# Patient Record
Sex: MALE | Race: WHITE | NOT HISPANIC OR LATINO | Employment: OTHER | URBAN - NONMETROPOLITAN AREA
[De-identification: names, ages, dates, MRNs, and addresses within clinical notes are randomized per-mention and may not be internally consistent; named-entity substitution may affect disease eponyms.]

---

## 2024-07-03 ENCOUNTER — OFFICE VISIT (OUTPATIENT)
Dept: FAMILY MEDICINE CLINIC | Facility: CLINIC | Age: 59
End: 2024-07-03
Payer: COMMERCIAL

## 2024-07-03 VITALS
WEIGHT: 231.8 LBS | DIASTOLIC BLOOD PRESSURE: 84 MMHG | OXYGEN SATURATION: 97 % | HEART RATE: 58 BPM | SYSTOLIC BLOOD PRESSURE: 122 MMHG | BODY MASS INDEX: 27.37 KG/M2 | HEIGHT: 77 IN

## 2024-07-03 DIAGNOSIS — Z12.5 PROSTATE CANCER SCREENING: ICD-10-CM

## 2024-07-03 DIAGNOSIS — Z13.220 LIPID SCREENING: ICD-10-CM

## 2024-07-03 DIAGNOSIS — Z13.29 THYROID DISORDER SCREEN: ICD-10-CM

## 2024-07-03 DIAGNOSIS — Z00.00 ANNUAL PHYSICAL EXAM: Primary | ICD-10-CM

## 2024-07-03 DIAGNOSIS — Z13.1 DIABETES MELLITUS SCREENING: ICD-10-CM

## 2024-07-03 DIAGNOSIS — Z12.11 COLON CANCER SCREENING: ICD-10-CM

## 2024-07-03 RX ORDER — METHYLPHENIDATE HYDROCHLORIDE 5 MG/1
1 TABLET ORAL EVERY 12 HOURS SCHEDULED
COMMUNITY
Start: 2024-06-21

## 2024-07-03 NOTE — PROGRESS NOTES
Male Physical Note      Date: 2024   Patient Name: Domingo Shipley  : 1965   MRN: [id number]     Chief Complaint:    Chief Complaint   Patient presents with    [facility] Care    Annual Exam     Patient states he has soreness in his joints, mainly hips/knees.  Would like referral for colonoscopy.    Patient is fasting for labs       History of Present Illness: Domingo Shipley is a 59 y.o. male who is here today for their annual health maintenance and physical.  Patient is here today without any significant complaints.  He is requesting labs and colonoscopy ordered.  He has some bilateral hip pain but would like to hold off on workup at this time.  Patient also has some left shoulder pain/rotator cuff issues but he wants to hold off on any evaluation at this time.      Subjective      Review of Systems:   Review of Systems   Musculoskeletal:  Positive for arthralgias.   All other systems reviewed and are negative.      Past Medical History, Social History, Family History and Care Team were all reviewed with patient and updated as appropriate.     Medications:     Current Outpatient Medications:     methylphenidate (RITALIN) 5 MG tablet, Take 1 tablet by mouth Every 12 (Twelve) Hours., Disp: , Rfl:     Allergies:   No Known Allergies    Immunizations:  Health Maintenance Summary            Overdue - COLORECTAL CANCER SCREENING (View Topic Details) Never done      No completion, postpone, or frequency change history exists for this topic.              Overdue - TDAP/TD VACCINES (1 - Tdap) Never done      No completion, postpone, or frequency change history exists for this topic.              Overdue - HEPATITIS C SCREENING (Once) Never done      No completion, postpone, or frequency change history exists for this topic.              Overdue - ANNUAL PHYSICAL (Yearly) Never done      No completion, postpone, or frequency change history exists for this topic.              Postponed - COVID-19 Vaccine (1 -  "2023-24 season) Postponed until 9/22/2024 07/03/2024  Postponed until 9/22/2024 by Brunilda Sabillon CMA (Product Unavailable)              Postponed - ZOSTER VACCINE (1 of 2) Postponed until 7/3/2025      07/03/2024  Postponed until 7/3/2025 by Brunilda Sabillon CMA (Product Unavailable)              INFLUENZA VACCINE (Yearly - August to March) Next due on 8/1/2024      No completion, postpone, or frequency change history exists for this topic.              BMI FOLLOWUP (Yearly) Next due on 7/3/2025      07/03/2024  Smart[employer]: BMI EDUCATION FOR OVERWEIGHT              Pneumococcal Vaccine 0-64 (Series Information) Aged Out      No completion, postpone, or frequency change history exists for this topic.                    No orders of the defined types were placed in this encounter.      Colorectal Screening:   Pending  Last Completed Colonoscopy       This patient has no relevant Health Maintenance data.          PSA (Over age 50, C Level Recommendation): Pending    A1c: No results found for: \"HGBA1C\"  Lipid panel: No results found for: \"LABLIPI\"    The ASCVD Risk score (Pilar MUNIZ, et al., 2019) failed to calculate for the following reasons:    Cannot find a previous HDL lab    Cannot find a previous total cholesterol lab      Tobacco Use: Low Risk  (7/3/2024)    Patient History     Smoking Tobacco Use: Never     Smokeless Tobacco Use: Never     Passive Exposure: Not on file       Social History     Substance and Sexual Activity   Alcohol Use Yes    Alcohol/week: 2.0 standard drinks of alcohol    Types: 2 Glasses of wine per week    Comment: generally 2 glasses a day (but skip some days)        Social History     Substance and Sexual Activity   Drug Use Never        PHQ-2 Depression Screening  PHQ-9 Total Score: 0         Objective     Physical Exam:  Vital Signs:   Vitals:    07/03/24 1350   BP: 122/84   BP Location: Left arm   Patient Position: Sitting   Cuff Size: Adult   Pulse: 58   SpO2: 97%   Weight: 105 " "kg (231 lb 12.8 oz)   Height: 195.6 cm (77\")     Facility age limit for growth %atiya is 20 years.  Body mass index is 27.49 kg/m².     Physical Exam  Vitals and nursing note reviewed.   Constitutional:       General: He is not in acute distress.     Appearance: Normal appearance. He is not ill-appearing or toxic-appearing.   HENT:      Head: Normocephalic and atraumatic.      Right Ear: Tympanic membrane normal.      Left Ear: Tympanic membrane normal.      Mouth/Throat:      Pharynx: Oropharynx is clear.   Eyes:      Extraocular Movements: Extraocular movements intact.      Pupils: Pupils are equal, round, and reactive to light.   Cardiovascular:      Rate and Rhythm: Normal rate and regular rhythm.   Pulmonary:      Effort: Pulmonary effort is normal.      Breath sounds: Normal breath sounds.   Musculoskeletal:      Left shoulder: Decreased range of motion.      Cervical back: Neck supple.      Right hip: Normal range of motion.      Left hip: Normal range of motion.   Lymphadenopathy:      Cervical: No cervical adenopathy.   Neurological:      General: No focal deficit present.      Mental Status: He is alert.   Psychiatric:         Mood and Affect: Mood normal.         Behavior: Behavior normal.         Thought Content: Thought content normal.         Judgment: Judgment normal.          POCT Results (if applicable):   No results found for this or any previous visit.    Procedures    Assessment / Plan      Assessment/Plan:   Assessment & Plan  Annual physical exam    Colon cancer screening    Lipid screening    Thyroid disorder screen    Prostate cancer screening    Diabetes mellitus screening      Orders Placed This Encounter   Procedures    Hemoglobin A1c    CBC Auto Differential    Comprehensive Metabolic Panel    Lipid Panel    TSH    PSA Screen    Ambulatory Referral to Gastroenterology      Will refer for colonoscopy.  Will check routine labs.  Follow-up in 1 year or as needed.       Healthcare " Maintenance:  Counseling provided based on age appropriate USPSTF guidelines.       Domingo Shipley voices understanding and acceptance of this advice and will call back with any further questions or concerns. AVS with preventive healthcare tips printed for patient.     Vaccine Counseling:      Follow Up:   Return in about 1 year (around 7/3/2025).      María North MD  [facility]

## 2024-07-04 LAB
ALBUMIN SERPL-MCNC: 4.7 G/DL (ref 3.8–4.9)
ALP SERPL-CCNC: 67 IU/L (ref 44–121)
ALT SERPL-CCNC: 19 IU/L (ref 0–44)
AST SERPL-CCNC: 21 IU/L (ref 0–40)
BASOPHILS # BLD AUTO: 0.1 X10E3/UL (ref 0–0.2)
BASOPHILS NFR BLD AUTO: 1 %
BILIRUB SERPL-MCNC: 1 MG/DL (ref 0–1.2)
BUN SERPL-MCNC: 12 MG/DL (ref 6–24)
BUN/CREAT SERPL: 13 (ref 9–20)
CALCIUM SERPL-MCNC: 9.7 MG/DL (ref 8.7–10.2)
CHLORIDE SERPL-SCNC: 104 MMOL/L (ref 96–106)
CHOLEST SERPL-MCNC: 168 MG/DL (ref 100–199)
CO2 SERPL-SCNC: 23 MMOL/L (ref 20–29)
CREAT SERPL-MCNC: 0.89 MG/DL (ref 0.76–1.27)
EGFRCR SERPLBLD CKD-EPI 2021: 100 ML/MIN/1.73
EOSINOPHIL # BLD AUTO: 0.1 X10E3/UL (ref 0–0.4)
EOSINOPHIL NFR BLD AUTO: 1 %
ERYTHROCYTE [DISTWIDTH] IN BLOOD BY AUTOMATED COUNT: 12.3 % (ref 11.6–15.4)
GLOBULIN SER CALC-MCNC: 2.3 G/DL (ref 1.5–4.5)
GLUCOSE SERPL-MCNC: 93 MG/DL (ref 70–99)
HBA1C MFR BLD: 5.6 % (ref 4.8–5.6)
HCT VFR BLD AUTO: 42.8 % (ref 37.5–51)
HDLC SERPL-MCNC: 48 MG/DL
HGB BLD-MCNC: 14.8 G/DL (ref 13–17.7)
IMM GRANULOCYTES # BLD AUTO: 0 X10E3/UL (ref 0–0.1)
IMM GRANULOCYTES NFR BLD AUTO: 0 %
LDLC SERPL CALC-MCNC: 106 MG/DL (ref 0–99)
LYMPHOCYTES # BLD AUTO: 2.2 X10E3/UL (ref 0.7–3.1)
LYMPHOCYTES NFR BLD AUTO: 29 %
MCH RBC QN AUTO: 31 PG (ref 26.6–33)
MCHC RBC AUTO-ENTMCNC: 34.6 G/DL (ref 31.5–35.7)
MCV RBC AUTO: 90 FL (ref 79–97)
MONOCYTES # BLD AUTO: 0.6 X10E3/UL (ref 0.1–0.9)
MONOCYTES NFR BLD AUTO: 8 %
NEUTROPHILS # BLD AUTO: 4.6 X10E3/UL (ref 1.4–7)
NEUTROPHILS NFR BLD AUTO: 61 %
PLATELET # BLD AUTO: 292 X10E3/UL (ref 150–450)
POTASSIUM SERPL-SCNC: 4.1 MMOL/L (ref 3.5–5.2)
PROT SERPL-MCNC: 7 G/DL (ref 6–8.5)
PSA SERPL-MCNC: 0.8 NG/ML (ref 0–4)
RBC # BLD AUTO: 4.78 X10E6/UL (ref 4.14–5.8)
SODIUM SERPL-SCNC: 141 MMOL/L (ref 134–144)
TRIGL SERPL-MCNC: 73 MG/DL (ref 0–149)
TSH SERPL DL<=0.005 MIU/L-ACNC: 1.21 UIU/ML (ref 0.45–4.5)
VLDLC SERPL CALC-MCNC: 14 MG/DL (ref 5–40)
WBC # BLD AUTO: 7.6 X10E3/UL (ref 3.4–10.8)

## 2024-07-05 ENCOUNTER — TELEPHONE (OUTPATIENT)
Dept: FAMILY MEDICINE CLINIC | Facility: CLINIC | Age: 59
End: 2024-07-05
Payer: COMMERCIAL

## 2024-07-05 NOTE — TELEPHONE ENCOUNTER
"Hub to relay Your LDL which is the \"bad cholesterol\" is borderline elevated, but your HDL/LDL ratio is good..  All other labs are within normal limits.  Will recheck in 1 year   "

## 2024-07-05 NOTE — TELEPHONE ENCOUNTER
Name: Domingo Shipley      Relationship: Self      Best Callback Number: [phone]       HUB PROVIDED THE RELAY MESSAGE FROM THE OFFICE      PATIENT: VOICED UNDERSTANDING AND HAS NO FURTHER QUESTIONS AT THIS TIME    ADDITIONAL INFORMATION:

## 2024-07-09 ENCOUNTER — TELEPHONE (OUTPATIENT)
Dept: GASTROENTEROLOGY | Facility: CLINIC | Age: 59
End: 2024-07-09
Payer: COMMERCIAL

## 2024-07-15 ENCOUNTER — OUTSIDE FACILITY SERVICE (OUTPATIENT)
Dept: GASTROENTEROLOGY | Facility: CLINIC | Age: 59
End: 2024-07-15
Payer: COMMERCIAL

## 2024-07-15 PROCEDURE — 88305 TISSUE EXAM BY PATHOLOGIST: CPT | Performed by: INTERNAL MEDICINE

## 2024-07-16 ENCOUNTER — LAB REQUISITION (OUTPATIENT)
Dept: LAB | Facility: HOSPITAL | Age: 59
End: 2024-07-16
Payer: COMMERCIAL

## 2024-07-16 DIAGNOSIS — Q43.8 OTHER SPECIFIED CONGENITAL MALFORMATIONS OF INTESTINE: ICD-10-CM

## 2024-07-16 DIAGNOSIS — D12.5 BENIGN NEOPLASM OF SIGMOID COLON: ICD-10-CM

## 2024-07-16 DIAGNOSIS — D12.2 BENIGN NEOPLASM OF ASCENDING COLON: ICD-10-CM

## 2024-07-16 DIAGNOSIS — D12.0 BENIGN NEOPLASM OF CECUM: ICD-10-CM

## 2024-07-16 DIAGNOSIS — D12.4 BENIGN NEOPLASM OF DESCENDING COLON: ICD-10-CM

## 2024-07-16 DIAGNOSIS — K64.8 OTHER HEMORRHOIDS: ICD-10-CM

## 2024-07-16 DIAGNOSIS — K57.30 DIVERTICULOSIS OF LARGE INTESTINE WITHOUT PERFORATION OR ABSCESS WITHOUT BLEEDING: ICD-10-CM

## 2024-07-16 DIAGNOSIS — Z12.11 ENCOUNTER FOR SCREENING FOR MALIGNANT NEOPLASM OF COLON: ICD-10-CM

## 2024-07-17 LAB — REF LAB TEST METHOD: NORMAL

## 2025-07-07 ENCOUNTER — OFFICE VISIT (OUTPATIENT)
Dept: FAMILY MEDICINE CLINIC | Facility: CLINIC | Age: 60
End: 2025-07-07
Payer: COMMERCIAL

## 2025-07-07 VITALS
HEIGHT: 77 IN | HEART RATE: 64 BPM | OXYGEN SATURATION: 98 % | SYSTOLIC BLOOD PRESSURE: 124 MMHG | DIASTOLIC BLOOD PRESSURE: 84 MMHG | WEIGHT: 219.7 LBS | BODY MASS INDEX: 25.94 KG/M2
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
## 2025-07-07 DIAGNOSIS — Z13.220 LIPID SCREENING: ICD-10-CM

## 2025-07-07 DIAGNOSIS — S70.362A TICK BITE OF LEFT THIGH, INITIAL ENCOUNTER: ICD-10-CM

## 2025-07-07 DIAGNOSIS — Z00.00 ANNUAL PHYSICAL EXAM: Primary | ICD-10-CM

## 2025-07-07 DIAGNOSIS — Z12.5 PROSTATE CANCER SCREENING: ICD-10-CM

## 2025-07-07 DIAGNOSIS — Z13.1 DIABETES MELLITUS SCREENING: ICD-10-CM

## 2025-07-07 PROCEDURE — 99396 PREV VISIT EST AGE 40-64: CPT | Performed by: FAMILY MEDICINE

## 2025-07-07 RX ORDER — DOXYCYCLINE 100 MG/1
100 CAPSULE ORAL 2 TIMES DAILY
Qty: 20 CAPSULE | Refills: 0 | Status: SHIPPED | OUTPATIENT
Start: 2025-07-07

## 2025-07-07 NOTE — PROGRESS NOTES
Male Physical Note      Date: 2025   Patient Name: Domingo Shipley  : 1965   MRN: [id number]     Chief Complaint:    Chief Complaint   Patient presents with    Annual Exam     Pt presents for an annual physical exam. Pt med hx includes ADHD. Updated pt med list.       History of Present Illness: Domingo Shipley is a 60 y.o. male who is here today for their annual health maintenance and physical.  Patient has a tick bite on the left inner thigh that has been red and swollen for the past several days.      Subjective      Review of Systems:   Review of Systems   Skin:  Positive for rash.   All other systems reviewed and are negative.      Past Medical History, Social History, Family History and Care Team were all reviewed with patient and updated as appropriate.     Medications:     Current Outpatient Medications:     methylphenidate (RITALIN) 5 MG tablet, Take 1 tablet by mouth Every 12 (Twelve) Hours., Disp: , Rfl:     doxycycline (MONODOX) 100 MG capsule, Take 1 capsule by mouth 2 (Two) Times a Day., Disp: 20 capsule, Rfl: 0    Allergies:   No Known Allergies    Immunizations:  Health Maintenance Summary            Current Care Gaps       ANNUAL PHYSICAL (Yearly) Overdue since 7/3/2025      2024  Registry Metric: Last Annual Physical              HEPATITIS C SCREENING (Once) Never done     No completion, postpone, or frequency change history exists for this topic.                      Needs Review       COLORECTAL CANCER SCREENING (COLONOSCOPY - Every 10 Years) Tentatively due on 7/15/2034      07/15/2024  Colonoscopy, Scan                      Upcoming       COVID-19 Vaccine (2024- season) Postponed until 2025  Postponed until 2026 by Vivien Liang MA (Product Unavailable)    2024  Postponed until 2024 by Brunilda Sabillon CMA (Product Unavailable)              Pneumococcal Vaccine 50+ (1 of  - PCV) Postponed until 2025   "Postponed until 1/1/2026 by Vivien Liang MA (Patient Refused)              ZOSTER VACCINE (1 of 2) Postponed until 1/1/2026 07/07/2025  Postponed until 1/1/2026 by Vivien Liang MA (Patient Refused)    07/03/2024  Postponed until 7/3/2025 by Brunilda Sabillon CMA (Product Unavailable)              TDAP/TD VACCINES (1 - Tdap) Postponed until 1/3/2026      07/07/2025  Postponed until 1/3/2026 by Vivien Liang MA (Patient Refused)              INFLUENZA VACCINE (Yearly - October to March) Next due on 10/1/2025     No completion, postpone, or frequency change history exists for this topic.                            No orders of the defined types were placed in this encounter.      Colorectal Screening:   Follow-up pending.  Last Completed Colonoscopy            Needs Review       COLORECTAL CANCER SCREENING (COLONOSCOPY - Every 10 Years) Tentatively due on 7/15/2034      07/15/2024  Colonoscopy, Scan                          A1c:   Hemoglobin A1C   Date Value Ref Range Status   07/03/2024 5.6 4.8 - 5.6 % Final     Comment:              Prediabetes: 5.7 - 6.4           Diabetes: >6.4           Glycemic control for adults with diabetes: <7.0     Lipid panel: No results found for: \"LABLIPI\"    The 10-year ASCVD risk score (Pilar MUNIZ, et al., 2019) is: 6%    Values used to calculate the score:      Age: 60 years      Sex: Male      Is Non- : No      Diabetic: No      Tobacco smoker: No      Systolic Blood Pressure: 124 mmHg      Is BP treated: No      HDL Cholesterol: 48 mg/dL      Total Cholesterol: 168 mg/dL      Tobacco Use: Low Risk  (7/7/2025)    Patient History     Smoking Tobacco Use: Never     Smokeless Tobacco Use: Never     Passive Exposure: Not on file       Social History     Substance and Sexual Activity   Alcohol Use Yes    Alcohol/week: 2.0 standard drinks of alcohol    Types: 2 Glasses of wine per week    Comment: generally 1 glasses a day (but skip some " "days)        Social History     Substance and Sexual Activity   Drug Use Never        Diet/Physical activity: Carnivore diet       Objective     Physical Exam:  Vital Signs:   Vitals:    07/07/25 1407   BP: 124/84   BP Location: Left arm   Patient Position: Sitting   Cuff Size: Large Adult   Pulse: 64   SpO2: 98%   Weight: 99.7 kg (219 lb 11.2 oz)   Height: 195.6 cm (77\")     Facility age limit for growth %atiya is 20 years.  Body mass index is 26.05 kg/m².     Physical Exam  Vitals and nursing note reviewed.   Constitutional:       Appearance: Normal appearance. He is not ill-appearing.   HENT:      Head: Normocephalic and atraumatic.      Right Ear: A middle ear effusion is present.      Left Ear: A middle ear effusion is present.      Mouth/Throat:      Pharynx: Oropharynx is clear.   Eyes:      Extraocular Movements: Extraocular movements intact.      Pupils: Pupils are equal, round, and reactive to light.   Cardiovascular:      Rate and Rhythm: Normal rate and regular rhythm.   Pulmonary:      Breath sounds: Normal breath sounds.   Lymphadenopathy:      Cervical: No cervical adenopathy.   Skin:            Comments: Erythematous papule noted on the left radial thigh.   Neurological:      Mental Status: He is alert.      Cranial Nerves: Cranial nerves 2-12 are intact.      Sensory: Sensation is intact.   Psychiatric:         Mood and Affect: Mood and affect normal.          POCT Results (if applicable):   Results for orders placed or performed in visit on 07/15/24   TISSUE EXAM, [facility] ([facility])    Collection Time: 07/15/24 12:25 PM    Specimen: Tissue   Result Value Ref Range    Reference Lab Report       [facility]  [address] [place] [address]  Phone: [phone] or [phone]  Fax: [phone]  Reyes Dawn M.D., Medical Director    PATIENT NAME                           LABORATORY NO.  EMRE PATRICK                         [id number]  [id number]        " "                 AGE              SEX  SSN           CLIENT REF #  [facility]           59      1965      [id number]   [id number]    [address]              REQUESTING [facility].     ATTENDING [place] [address]                NAYAN ENGLISH ARLEY  DATE COLLECTED      DATE RECEIVED      DATE REPORTED  07/15/2024          2024         2024    DIAGNOSIS:  A.   SIGMOID COLON POLYP:  Tubular adenoma  Negative for high-grade dysplasia or malignancy  B.   CECUM POLYP:  Tubular adenoma  Negative for high-grade dysplasia or malignancy  C.   ASCENDING COLON POLYP:  Tubular adenoma  Negative for high-grade dysplasia  or malignancy  D.   DESCENDING COLON POLYP:  Tubular adenoma  Negative for high-grade dysplasia or malignancy    GJK    CLINICAL HISTORY:  Diverticulosis of large intestine without perforation or abscess without bleeding,  Encounter for screening for malignant neoplasm of colon, other specified  congenital malformations of intestine, Benign neoplasm of cecum, Benign  neoplasm of ascending colon, Benign neoplasm of descending colon, Benign  neoplasm of sigmoid colon, other hemorrhoids    SPECIMENS RECEIVED:  A.  SIGMOID COLON POLYP  B.  CECUM POLYP  C.  ASCENDING COLON POLYP  D.  DESCENDING COLON POLYP    MICROSCOPIC DESCRIPTION:  Tissue blocks are prepared and slides are examined microscopically on all  specimens. See diagnosis for details.    Professional interpretation rendered by Roni Shi M.D., F.C.A.P. at [facility], [email or web address], [address] [place] [address], [place] [address].    GROSS DESCRIPTION:  A.  Labeled \"sigmoid colon polyp\" is a 0.6 x 0.6 x 0.2 cm portion of tan soft  tissue which is  submitted entirely in 1 block.  MTH  B.  Labeled \"cecum polyp\" are multiple portions of tan soft tissue measuring  1.7 x 1.0 x 0.3 cm in aggregate, filtered and submitted entirely in 1 block.  C.  Labeled \"ascending colon polyp\" are 3 portions of tan soft " "tissue  measuring 0.8 x 0.3 x 0.2 cm in aggregate, submitted entirely in 1 block.  D.  Labeled \"descending colon polyp\" is a 0.3 x 0.2 x 0.2 cm portion of tan  soft tissue which is submitted entirely in 1 block.    REVIEWED, DIAGNOSED AND ELECTRONICALLY  SIGNED BY:    Roni Shi M.D., NAYLA.  CPT CODES:  88305x4         Procedures    Assessment / Plan      Assessment/Plan:   Assessment & Plan  Annual physical exam    Orders:    CBC & Differential    Hemoglobin A1c    Comprehensive Metabolic Panel    Lipid Panel    PSA Screen    Prostate cancer screening    Orders:    PSA Screen    Lipid screening    Orders:    Lipid Panel    Diabetes mellitus screening    Orders:    Hemoglobin A1c    Tick bite of left thigh, initial encounter    Orders:    doxycycline (MONODOX) 100 MG capsule; Take 1 capsule by mouth 2 (Two) Times a Day.         Healthcare Maintenance:  Counseling provided based on age appropriate USPSTF guidelines.      Domingo Quinten voices understanding and acceptance of this advice and will call back with any further questions or concerns. AVS with preventive healthcare tips printed for patient.     Vaccine Counseling:      Follow Up:   No follow-ups on file.      María North MD  [facility]     "

## 2025-07-12 LAB
ALBUMIN SERPL-MCNC: 4.1 G/DL (ref 3.5–5.2)
ALBUMIN/GLOB SERPL: 1.6 G/DL
ALP SERPL-CCNC: 66 U/L (ref 39–117)
ALT SERPL-CCNC: 24 U/L (ref 1–41)
AST SERPL-CCNC: 20 U/L (ref 1–40)
BASOPHILS # BLD AUTO: 0.05 10*3/MM3 (ref 0–0.2)
BASOPHILS NFR BLD AUTO: 0.7 % (ref 0–1.5)
BILIRUB SERPL-MCNC: 0.8 MG/DL (ref 0–1.2)
BUN SERPL-MCNC: 17 MG/DL (ref 6–20)
BUN/CREAT SERPL: 16.2 (ref 7–25)
CALCIUM SERPL-MCNC: 9.5 MG/DL (ref 8.6–10.5)
CHLORIDE SERPL-SCNC: 106 MMOL/L (ref 98–107)
CHOLEST SERPL-MCNC: 190 MG/DL (ref 0–200)
CO2 SERPL-SCNC: 24.1 MMOL/L (ref 22–29)
CREAT SERPL-MCNC: 1.05 MG/DL (ref 0.76–1.27)
EGFRCR SERPLBLD CKD-EPI 2021: 82.3 ML/MIN/1.73
EOSINOPHIL # BLD AUTO: 0.09 10*3/MM3 (ref 0–0.4)
EOSINOPHIL NFR BLD AUTO: 1.2 % (ref 0.3–6.2)
ERYTHROCYTE [DISTWIDTH] IN BLOOD BY AUTOMATED COUNT: 13.2 % (ref 12.3–15.4)
GLOBULIN SER CALC-MCNC: 2.6 GM/DL
GLUCOSE SERPL-MCNC: 101 MG/DL (ref 65–99)
HBA1C MFR BLD: 5.2 % (ref 4.8–5.6)
HCT VFR BLD AUTO: 43.5 % (ref 37.5–51)
HDLC SERPL-MCNC: 57 MG/DL (ref 40–60)
HGB BLD-MCNC: 14.2 G/DL (ref 13–17.7)
IMM GRANULOCYTES # BLD AUTO: 0.02 10*3/MM3 (ref 0–0.05)
IMM GRANULOCYTES NFR BLD AUTO: 0.3 % (ref 0–0.5)
LDLC SERPL CALC-MCNC: 118 MG/DL (ref 0–100)
LYMPHOCYTES # BLD AUTO: 2.91 10*3/MM3 (ref 0.7–3.1)
LYMPHOCYTES NFR BLD AUTO: 38.3 % (ref 19.6–45.3)
MCH RBC QN AUTO: 30.3 PG (ref 26.6–33)
MCHC RBC AUTO-ENTMCNC: 32.6 G/DL (ref 31.5–35.7)
MCV RBC AUTO: 92.9 FL (ref 79–97)
MONOCYTES # BLD AUTO: 0.62 10*3/MM3 (ref 0.1–0.9)
MONOCYTES NFR BLD AUTO: 8.2 % (ref 5–12)
NEUTROPHILS # BLD AUTO: 3.91 10*3/MM3 (ref 1.7–7)
NEUTROPHILS NFR BLD AUTO: 51.3 % (ref 42.7–76)
NRBC BLD AUTO-RTO: 0 /100 WBC (ref 0–0.2)
PLATELET # BLD AUTO: 272 10*3/MM3 (ref 140–450)
POTASSIUM SERPL-SCNC: 4.2 MMOL/L (ref 3.5–5.2)
PROT SERPL-MCNC: 6.7 G/DL (ref 6–8.5)
PSA SERPL-MCNC: 0.73 NG/ML (ref 0–4)
RBC # BLD AUTO: 4.68 10*6/MM3 (ref 4.14–5.8)
SODIUM SERPL-SCNC: 141 MMOL/L (ref 136–145)
TRIGL SERPL-MCNC: 84 MG/DL (ref 0–150)
VLDLC SERPL CALC-MCNC: 15 MG/DL (ref 5–40)
WBC # BLD AUTO: 7.6 10*3/MM3 (ref 3.4–10.8)